# Patient Record
(demographics unavailable — no encounter records)

---

## 2025-01-08 NOTE — ASSESSMENT
[FreeTextEntry1] : 21-year-old female who is healthy without history of smoking and denies chemical exposures was initially referred to me for microhematuria evaluation and is here to follow-up for this.  Ordered urine microscopy.   Follow up as needed if result of above is unremarkable.

## 2025-01-08 NOTE — PHYSICAL EXAM
[Normal Appearance] : normal appearance [Well Groomed] : well groomed [General Appearance - In No Acute Distress] : no acute distress [] : no respiratory distress [Respiration, Rhythm And Depth] : normal respiratory rhythm and effort [Exaggerated Use Of Accessory Muscles For Inspiration] : no accessory muscle use [Normal Station and Gait] : the gait and station were normal for the patient's age [Affect] : the affect was normal [Oriented To Time, Place, And Person] : oriented to person, place, and time [Mood] : the mood was normal

## 2025-01-08 NOTE — HISTORY OF PRESENT ILLNESS
[FreeTextEntry1] : 21-year-old female who is healthy without history of smoking and denies chemical exposures was initially referred to me for microhematuria evaluation and is here to follow-up for this.  Initial office visit 05/24/2024: States she had routine labs done with her PCP and was told she had microhematuria. Per patient, a repeat U/A was done and had microscopic hematuria again, so was referred to see Urology. She also had a pelvic ultrasound done in March 2024. The report was unremarkable; it did not evaluate her kidneys or bladder.  Denies urinary frequency, dysuria, or gross hematuria  U/A today shows large blood, negative for nitrites or leuk est  Denies ever smoking, history of chemical exposure  Family history of microhematuria in sister Denies family history of kidney or bladder cancer  Today's office visit 01/08/2025: She is accompanied by her sister today. Patient did not obtain the 6-month follow-up urine microscopy.  She denies any changes to her urinary habits and has no concerns with her urination. She is not currently on her menstrual cycle or recently coming off of it. Denies dysuria or gross hematuria.